# Patient Record
Sex: FEMALE | Race: WHITE | NOT HISPANIC OR LATINO | Employment: UNEMPLOYED | RURAL
[De-identification: names, ages, dates, MRNs, and addresses within clinical notes are randomized per-mention and may not be internally consistent; named-entity substitution may affect disease eponyms.]

---

## 2022-11-10 ENCOUNTER — OFFICE VISIT (OUTPATIENT)
Dept: FAMILY MEDICINE CLINIC | Facility: CLINIC | Age: 37
End: 2022-11-10

## 2022-11-10 VITALS
DIASTOLIC BLOOD PRESSURE: 78 MMHG | OXYGEN SATURATION: 99 % | WEIGHT: 192.44 LBS | SYSTOLIC BLOOD PRESSURE: 112 MMHG | BODY MASS INDEX: 36.33 KG/M2 | HEIGHT: 61 IN | HEART RATE: 86 BPM

## 2022-11-10 DIAGNOSIS — K62.5 BRBPR (BRIGHT RED BLOOD PER RECTUM): Primary | ICD-10-CM

## 2022-11-10 DIAGNOSIS — R19.09 ABDOMINAL MASS OF OTHER SITE: ICD-10-CM

## 2022-11-10 PROBLEM — R19.00 ABDOMINAL MASS: Status: ACTIVE | Noted: 2022-11-10

## 2022-11-10 PROCEDURE — 99214 OFFICE O/P EST MOD 30 MIN: CPT | Performed by: NURSE PRACTITIONER

## 2022-11-10 RX ORDER — HYDROCORTISONE ACETATE 25 MG/1
25 SUPPOSITORY RECTAL 2 TIMES DAILY
Qty: 28 SUPPOSITORY | Refills: 0 | Status: SHIPPED | OUTPATIENT
Start: 2022-11-10

## 2022-11-10 NOTE — ASSESSMENT & PLAN NOTE
Will refer to general surgery for evaluation.  States she will wait and see general surgery to see what they recommend before determining if imaging such as CT scan is needed.  Education provided and she knows to go to the hospital if she develops any pain or develops any new symptoms.  Informed possibly could be a hernia.  Go to ED if worsens.  Return to clinic or ED with any issues or concerns.

## 2022-11-10 NOTE — PROGRESS NOTES
"Chief Complaint  Rectal Bleeding    Subjective          Jules Lorena Cuevas presents to [facility]  History of Present Illness     Patient states about a week ago she noticed some blood on her toilet paper and in the toilet bowl after having a bowel movement.  States she has noticed it about 3 times total.  Only happens with bowel movement.  States stool looks fine with no blood in the stool no black stool.  No nausea no vomiting no abdominal pain.  States she is also randomly noticed a small mass present to her abdominal area near the bellybutton.  Unsure if it is related.  States at times it does get a little bit sore.  No fever no chills no dizziness no headache no chest pain no chest pressure no shortness of breath no trouble breathing.  States no family history of any GI issues.  She does states she usually is regular with her bowel movements having them 1-2 times a day but states over the past couple weeks she feels she has gotten a little bit backed up.  States she has been averaging a bowel movement over the past 1 to 2 weeks about every other day and her stool is hard.    Objective   Vital Signs:   /78   Pulse 86   Ht 154.9 cm (61\")   Wt 87.3 kg (192 lb 7 oz)   SpO2 99%   BMI 36.36 kg/m²     Body mass index is 36.36 kg/m².    Review of Systems   Constitutional: Negative for chills, fatigue, fever and unexpected weight loss.   Respiratory: Negative for cough, shortness of breath and wheezing.    Cardiovascular: Negative for chest pain.   Gastrointestinal: Positive for abdominal pain, anal bleeding and constipation. Negative for abdominal distention, diarrhea, nausea, rectal pain, vomiting, GERD and indigestion.        States the abdominal pain is relating to the mild occasional discomfort she has noticed to the small mass like area above belly button    Genitourinary: Negative for dysuria, frequency and hematuria.   Musculoskeletal: Negative for arthralgias and " myalgias.   Skin: Negative for rash and wound.   Neurological: Negative for dizziness, weakness, light-headedness and headache.   Psychiatric/Behavioral: Negative for suicidal ideas.       Past History:  Medical History: has a past medical history of Anxiety.   Surgical History: has no past surgical history on file.   Family History: family history includes Breast cancer in an other family member.   Social History: reports that she has never smoked. She has never used smokeless tobacco.    PHQ-2 Depression Screening  Little interest or pleasure in doing things? 0-->not at all   Feeling down, depressed, or hopeless? 0-->not at all   PHQ-2 Total Score 0        PHQ-9 Depression Screening  Little interest or pleasure in doing things? 0-->not at all   Feeling down, depressed, or hopeless? 0-->not at all   Trouble falling or staying asleep, or sleeping too much?     Feeling tired or having little energy?     Poor appetite or overeating?     Feeling bad about yourself - or that you are a failure or have let yourself or your family down?     Trouble concentrating on things, such as reading the newspaper or watching television?     Moving or speaking so slowly that other people could have noticed? Or the opposite - being so fidgety or restless that you have been moving around a lot more than usual?     Thoughts that you would be better off dead, or of hurting yourself in some way?     PHQ-9 Total Score 0   If you checked off any problems, how difficult have these problems made it for you to do your work, take care of things at home, or get along with other people?       PHQ-9 Total Score: 0      Patient screened positive for depression based on a PHQ-9 score of 0 on 11/10/2022. Follow-up recommendations include:       Current Outpatient Medications:   •  hydrocortisone (ANUSOL-HC) 25 MG suppository, Insert 1 suppository into the rectum 2 (Two) Times a Day. Up to 2 weeks, Disp: 28 suppository, Rfl: 0   (Not in a hospital  admission)     Allergies: Patient has no known allergies.    Physical Exam  Constitutional:       Appearance: Normal appearance.   Cardiovascular:      Rate and Rhythm: Normal rate and regular rhythm.      Heart sounds: Normal heart sounds.   Pulmonary:      Effort: Pulmonary effort is normal.      Breath sounds: Normal breath sounds.   Abdominal:      General: Abdomen is flat. Bowel sounds are normal. There is no distension.      Palpations: Abdomen is soft.      Tenderness: There is no abdominal tenderness. There is no guarding or rebound.   Genitourinary:     Comments: Pt. Denied examination and understands the risk.   Skin:     Findings: No erythema, lesion or rash.          Neurological:      General: No focal deficit present.      Mental Status: She is alert and oriented to person, place, and time. Mental status is at baseline.   Psychiatric:         Mood and Affect: Mood normal.         Behavior: Behavior normal.         Thought Content: Thought content normal.         Judgment: Judgment normal.          Result Review :                   Assessment and Plan    Diagnoses and all orders for this visit:    1. BRBPR (bright red blood per rectum) (Primary)  Assessment & Plan:  Patient denied physical examination.  Informed her this possibly could be due to hemorrhoids so I will give her a round of Anusol suppositories to use.  Informed her that she really needs to control her constipation so informed her to try adding on Metamucil daily, increasing her water intake, and using MiraLAX.  Education provided.  Risk of meds discussed and understood.  Will refer to general surgery for evaluation of this.  Go to ED if worsens.  Return to clinic or ED with any issues or concerns.    Orders:  -     hydrocortisone (ANUSOL-HC) 25 MG suppository; Insert 1 suppository into the rectum 2 (Two) Times a Day. Up to 2 weeks  Dispense: 28 suppository; Refill: 0  -     Ambulatory Referral to General Surgery  -     CBC & Differential;  Future    2. Abdominal mass of other site  Assessment & Plan:  Will refer to general surgery for evaluation.  States she will wait and see general surgery to see what they recommend before determining if imaging such as CT scan is needed.  Education provided and she knows to go to the hospital if she develops any pain or develops any new symptoms.  Informed possibly could be a hernia.  Go to ED if worsens.  Return to clinic or ED with any issues or concerns.    Orders:  -     Ambulatory Referral to General Surgery  -     Comprehensive Metabolic Panel; Future                 Follow Up   Return if symptoms worsen or fail to improve.  Patient was given instructions and counseling regarding her condition or for health maintenance advice. Please see specific information pulled into the AVS if appropriate.     JAREK Forrest

## 2022-11-10 NOTE — ASSESSMENT & PLAN NOTE
Patient denied physical examination.  Informed her this possibly could be due to hemorrhoids so I will give her a round of Anusol suppositories to use.  Informed her that she really needs to control her constipation so informed her to try adding on Metamucil daily, increasing her water intake, and using MiraLAX.  Education provided.  Risk of meds discussed and understood.  Will refer to general surgery for evaluation of this.  Go to ED if worsens.  Return to clinic or ED with any issues or concerns.

## 2022-11-11 LAB
ALBUMIN SERPL-MCNC: 4.3 G/DL (ref 3.8–4.8)
ALBUMIN/GLOB SERPL: 2 {RATIO} (ref 1.2–2.2)
ALP SERPL-CCNC: 59 IU/L (ref 44–121)
ALT SERPL-CCNC: 14 IU/L (ref 0–32)
AST SERPL-CCNC: 17 IU/L (ref 0–40)
BASOPHILS # BLD AUTO: 0.1 X10E3/UL (ref 0–0.2)
BASOPHILS NFR BLD AUTO: 1 %
BILIRUB SERPL-MCNC: <0.2 MG/DL (ref 0–1.2)
BUN SERPL-MCNC: 15 MG/DL (ref 6–20)
BUN/CREAT SERPL: 14 (ref 9–23)
CALCIUM SERPL-MCNC: 9 MG/DL (ref 8.7–10.2)
CHLORIDE SERPL-SCNC: 106 MMOL/L (ref 96–106)
CO2 SERPL-SCNC: 21 MMOL/L (ref 20–29)
CREAT SERPL-MCNC: 1.08 MG/DL (ref 0.57–1)
EGFRCR SERPLBLD CKD-EPI 2021: 69 ML/MIN/1.73
EOSINOPHIL # BLD AUTO: 0.1 X10E3/UL (ref 0–0.4)
EOSINOPHIL NFR BLD AUTO: 1 %
ERYTHROCYTE [DISTWIDTH] IN BLOOD BY AUTOMATED COUNT: 12.8 % (ref 11.7–15.4)
GLOBULIN SER CALC-MCNC: 2.2 G/DL (ref 1.5–4.5)
GLUCOSE SERPL-MCNC: 91 MG/DL (ref 70–99)
HCT VFR BLD AUTO: 35.5 % (ref 34–46.6)
HGB BLD-MCNC: 11.6 G/DL (ref 11.1–15.9)
IMM GRANULOCYTES # BLD AUTO: 0 X10E3/UL (ref 0–0.1)
IMM GRANULOCYTES NFR BLD AUTO: 0 %
LYMPHOCYTES # BLD AUTO: 2.3 X10E3/UL (ref 0.7–3.1)
LYMPHOCYTES NFR BLD AUTO: 23 %
MCH RBC QN AUTO: 29.8 PG (ref 26.6–33)
MCHC RBC AUTO-ENTMCNC: 32.7 G/DL (ref 31.5–35.7)
MCV RBC AUTO: 91 FL (ref 79–97)
MONOCYTES # BLD AUTO: 0.6 X10E3/UL (ref 0.1–0.9)
MONOCYTES NFR BLD AUTO: 6 %
NEUTROPHILS # BLD AUTO: 6.9 X10E3/UL (ref 1.4–7)
NEUTROPHILS NFR BLD AUTO: 69 %
PLATELET # BLD AUTO: 330 X10E3/UL (ref 150–450)
POTASSIUM SERPL-SCNC: 4.3 MMOL/L (ref 3.5–5.2)
PROT SERPL-MCNC: 6.5 G/DL (ref 6–8.5)
RBC # BLD AUTO: 3.89 X10E6/UL (ref 3.77–5.28)
SODIUM SERPL-SCNC: 140 MMOL/L (ref 134–144)
WBC # BLD AUTO: 10 X10E3/UL (ref 3.4–10.8)

## 2023-10-20 ENCOUNTER — OFFICE VISIT (OUTPATIENT)
Dept: FAMILY MEDICINE CLINIC | Facility: CLINIC | Age: 38
End: 2023-10-20
Payer: COMMERCIAL

## 2023-10-20 VITALS
DIASTOLIC BLOOD PRESSURE: 82 MMHG | WEIGHT: 209.8 LBS | SYSTOLIC BLOOD PRESSURE: 128 MMHG | HEIGHT: 61 IN | TEMPERATURE: 98.4 F | BODY MASS INDEX: 39.61 KG/M2 | HEART RATE: 78 BPM

## 2023-10-20 DIAGNOSIS — F41.9 ANXIETY: Primary | ICD-10-CM

## 2023-10-20 DIAGNOSIS — E66.9 CLASS 2 OBESITY WITHOUT SERIOUS COMORBIDITY WITH BODY MASS INDEX (BMI) OF 39.0 TO 39.9 IN ADULT, UNSPECIFIED OBESITY TYPE: ICD-10-CM

## 2023-10-20 DIAGNOSIS — M62.838 MUSCLE SPASM: ICD-10-CM

## 2023-10-20 DIAGNOSIS — F33.0 MAJOR DEPRESSIVE DISORDER, RECURRENT, MILD: ICD-10-CM

## 2023-10-20 PROBLEM — E66.812 CLASS 2 OBESITY WITHOUT SERIOUS COMORBIDITY WITH BODY MASS INDEX (BMI) OF 39.0 TO 39.9 IN ADULT: Status: ACTIVE | Noted: 2023-10-20

## 2023-10-20 RX ORDER — BUSPIRONE HYDROCHLORIDE 5 MG/1
5 TABLET ORAL 3 TIMES DAILY
Qty: 90 TABLET | Refills: 5 | Status: SHIPPED | OUTPATIENT
Start: 2023-10-20

## 2023-10-20 RX ORDER — KETOROLAC TROMETHAMINE 10 MG/1
10 TABLET, FILM COATED ORAL EVERY 6 HOURS PRN
Qty: 20 TABLET | Refills: 0 | Status: SHIPPED | OUTPATIENT
Start: 2023-10-20 | End: 2023-10-25

## 2023-10-20 RX ORDER — ESCITALOPRAM OXALATE 10 MG/1
10 TABLET ORAL DAILY
Qty: 90 TABLET | Refills: 1 | Status: SHIPPED | OUTPATIENT
Start: 2023-10-20

## 2023-10-20 RX ORDER — KETOROLAC TROMETHAMINE 30 MG/ML
30 INJECTION, SOLUTION INTRAMUSCULAR; INTRAVENOUS ONCE
Status: COMPLETED | OUTPATIENT
Start: 2023-10-20 | End: 2023-10-20

## 2023-10-20 RX ORDER — BACLOFEN 10 MG/1
10 TABLET ORAL NIGHTLY PRN
Qty: 30 TABLET | Refills: 1 | Status: SHIPPED | OUTPATIENT
Start: 2023-10-20

## 2023-10-20 RX ADMIN — KETOROLAC TROMETHAMINE 30 MG: 30 INJECTION, SOLUTION INTRAMUSCULAR; INTRAVENOUS at 13:55

## 2023-10-20 NOTE — PROGRESS NOTES
Subjective     Chief Complaint  Anxiety and Depression (Few wks)    Subjective          Jules Cuevas is a 38 y.o. female who presents today to [facility] for initial evaluation .    HPI:   History of Present Illness    Ms. Cuevas is a pleasant 38-year-old female who presents today to establish care with a primary care physician.  Her past medical history includes anxiety and depression, in the past she did have an episode of bright red blood per rectum was found to be hemorrhoids.    Over the last few weeks she has been experiencing increase in her anxiety and depression. In the past she took Lexapro for her anxiety and depression. She noted that it did help. She was able to taper off due to not being symptomatic. However, over the last few weeks symptoms have been a lot worse. She has been to counseling in the past, not currently. She reports that her insurance does not cover counseling. She is tearful in office today.     She states that she slept wrong last night and has significant muscle spasm in her neck.     The following portions of the patient's history were reviewed and updated as appropriate: allergies, current medications, past family history, past medical history, past social history, past surgical history and problem list.    Objective     Objective     Allergy:   No Known Allergies     Current Medications:   Current Outpatient Medications   Medication Sig Dispense Refill    baclofen (LIORESAL) 10 MG tablet Take 1 tablet by mouth At Night As Needed for Muscle Spasms. 30 tablet 1    busPIRone (BUSPAR) 5 MG tablet Take 1 tablet by mouth 3 (Three) Times a Day. 90 tablet 5    escitalopram (Lexapro) 10 MG tablet Take 1 tablet by mouth Daily. 90 tablet 1    hydrocortisone (ANUSOL-HC) 25 MG suppository Insert 1 suppository into the rectum 2 (Two) Times a Day. Up to 2 weeks 28 suppository 0    ketorolac (TORADOL) 10 MG tablet Take 1 tablet by mouth Every 6 (Six)  "Hours As Needed for Moderate Pain for up to 5 days. 20 tablet 0     Current Facility-Administered Medications   Medication Dose Route Frequency Provider Last Rate Last Admin    ketorolac (TORADOL) injection 30 mg  30 mg Intramuscular Once Urvashi, Lucero, DO        ketorolac (TORADOL) injection 30 mg  30 mg Intramuscular Once Urvashi, Lucero, DO           Past Medical History:  Past Medical History:   Diagnosis Date    Anxiety        Past Surgical History:  History reviewed. No pertinent surgical history.    Social History:  Social History     Socioeconomic History    Marital status: Single   Tobacco Use    Smoking status: Never     Passive exposure: Never    Smokeless tobacco: Never   Vaping Use    Vaping Use: Never used   Substance and Sexual Activity    Alcohol use: Never    Drug use: Never    Sexual activity: Defer       Family History:  Family History   Problem Relation Age of Onset    Breast cancer Other         Close relative - grandmother           Vital Signs:   /82   Pulse 78   Temp 98.4 °F (36.9 °C) (Infrared)   Ht 154.9 cm (60.98\")   Wt 95.2 kg (209 lb 12.8 oz)   BMI 39.66 kg/m²      Physical Exam:  Physical Exam  Constitutional:       Appearance: She is not ill-appearing.   Eyes:      Pupils: Pupils are equal, round, and reactive to light.   Cardiovascular:      Rate and Rhythm: Normal rate.      Pulses: Normal pulses.   Pulmonary:      Effort: Pulmonary effort is normal.      Breath sounds: Normal breath sounds.   Neurological:      General: No focal deficit present.      Mental Status: She is alert. Mental status is at baseline.   Psychiatric:         Mood and Affect: Mood is anxious. Affect is tearful.         Speech: Speech normal.         Behavior: Behavior normal.         Thought Content: Thought content does not include homicidal or suicidal ideation.         Cognition and Memory: Cognition normal.               PHQ-9 Score  PHQ-9 Total Score: 8     Lab Review  No visits with results " within 3 Month(s) from this visit.   Latest known visit with results is:   Office Visit on 11/10/2022   Component Date Value Ref Range Status    Glucose 11/10/2022 91  70 - 99 mg/dL Final    BUN 11/10/2022 15  6 - 20 mg/dL Final    Creatinine 11/10/2022 1.08 (H)  0.57 - 1.00 mg/dL Final    EGFR Result 11/10/2022 69  >59 mL/min/1.73 Final    BUN/Creatinine Ratio 11/10/2022 14  9 - 23 Final    Sodium 11/10/2022 140  134 - 144 mmol/L Final    Potassium 11/10/2022 4.3  3.5 - 5.2 mmol/L Final    Chloride 11/10/2022 106  96 - 106 mmol/L Final    Total CO2 11/10/2022 21  20 - 29 mmol/L Final    Calcium 11/10/2022 9.0  8.7 - 10.2 mg/dL Final    Total Protein 11/10/2022 6.5  6.0 - 8.5 g/dL Final    Albumin 11/10/2022 4.3  3.8 - 4.8 g/dL Final    Globulin 11/10/2022 2.2  1.5 - 4.5 g/dL Final    A/G Ratio 11/10/2022 2.0  1.2 - 2.2 Final    Total Bilirubin 11/10/2022 <0.2  0.0 - 1.2 mg/dL Final    Alkaline Phosphatase 11/10/2022 59  44 - 121 IU/L Final    AST (SGOT) 11/10/2022 17  0 - 40 IU/L Final    ALT (SGPT) 11/10/2022 14  0 - 32 IU/L Final    WBC 11/10/2022 10.0  3.4 - 10.8 x10E3/uL Final    RBC 11/10/2022 3.89  3.77 - 5.28 x10E6/uL Final    Hemoglobin 11/10/2022 11.6  11.1 - 15.9 g/dL Final    Hematocrit 11/10/2022 35.5  34.0 - 46.6 % Final    MCV 11/10/2022 91  79 - 97 fL Final    MCH 11/10/2022 29.8  26.6 - 33.0 pg Final    MCHC 11/10/2022 32.7  31.5 - 35.7 g/dL Final    RDW 11/10/2022 12.8  11.7 - 15.4 % Final    Platelets 11/10/2022 330  150 - 450 x10E3/uL Final    Neutrophil Rel % 11/10/2022 69  Not Estab. % Final    Lymphocyte Rel % 11/10/2022 23  Not Estab. % Final    Monocyte Rel % 11/10/2022 6  Not Estab. % Final    Eosinophil Rel % 11/10/2022 1  Not Estab. % Final    Basophil Rel % 11/10/2022 1  Not Estab. % Final    Neutrophils Absolute 11/10/2022 6.9  1.4 - 7.0 x10E3/uL Final    Lymphocytes Absolute 11/10/2022 2.3  0.7 - 3.1 x10E3/uL Final    Monocytes Absolute 11/10/2022 0.6  0.1 - 0.9 x10E3/uL Final     Eosinophils Absolute 11/10/2022 0.1  0.0 - 0.4 x10E3/uL Final    Basophils Absolute 11/10/2022 0.1  0.0 - 0.2 x10E3/uL Final    Immature Granulocyte Rel % 11/10/2022 0  Not Estab. % Final    Immature Grans Absolute 11/10/2022 0.0  0.0 - 0.1 x10E3/uL Final        Radiology Results        Assessment / Plan         Assessment and Plan   Diagnoses and all orders for this visit:    1. Anxiety (Primary)  Assessment & Plan:  Patient's depression and anxiety  is recurrent and is moderate without psychosis. Their depression is currently active and the condition is worsening. This will be reassessed at the next regular appointment. F/U as described:patient was prescribed an antidepressant medicine.    I had a discussion with this patient about medication options.   After, discussion will start: Lexapro and Buspar  Discussed with patient that it can take 4-6 weeks before they sees the best benefit in symptoms.   They deny SI/HI          Orders:  -     escitalopram (Lexapro) 10 MG tablet; Take 1 tablet by mouth Daily.  Dispense: 90 tablet; Refill: 1  -     busPIRone (BUSPAR) 5 MG tablet; Take 1 tablet by mouth 3 (Three) Times a Day.  Dispense: 90 tablet; Refill: 5    2. Major depressive disorder, recurrent, mild  -     escitalopram (Lexapro) 10 MG tablet; Take 1 tablet by mouth Daily.  Dispense: 90 tablet; Refill: 1  -     busPIRone (BUSPAR) 5 MG tablet; Take 1 tablet by mouth 3 (Three) Times a Day.  Dispense: 90 tablet; Refill: 5    3. Class 2 obesity without serious comorbidity with body mass index (BMI) of 39.0 to 39.9 in adult, unspecified obesity type  Assessment & Plan:  Patient's (Body mass index is 39.66 kg/m².) indicates that they are obese (BMI >30) with health conditions that include none . Weight is newly identified. BMI  is above average; BMI management plan is completed. We discussed low calorie, low carb based diet program, portion control, and increasing exercise. (Shared in AVS)       4. Muscle  spasm  Assessment & Plan:  Rx Baclofen for muscle spasm  Toradol in office today   Apply a compressive ACE bandage. Rest and elevate the affected painful area.  Apply cold compresses intermittently as needed.  As pain recedes, begin normal activities slowly as tolerated.  Call if symptoms persist.       Orders:  -     baclofen (LIORESAL) 10 MG tablet; Take 1 tablet by mouth At Night As Needed for Muscle Spasms.  Dispense: 30 tablet; Refill: 1  -     ketorolac (TORADOL) injection 30 mg  -     ketorolac (TORADOL) injection 30 mg  -     ketorolac (TORADOL) 10 MG tablet; Take 1 tablet by mouth Every 6 (Six) Hours As Needed for Moderate Pain for up to 5 days.  Dispense: 20 tablet; Refill: 0        Discussed possible differential diagnoses, testing, treatment, recommended non-pharmacological interventions, risks, warning signs to monitor for that would indicate need for follow-up in clinic or ER. If no improvement with these regimens or you have new or worsening symptoms follow-up. Patient verbalizes understanding and agreement with plan of care. Denies further needs or concerns.     Patient was given instructions and counseling regarding her condition and for health maintenance advised.      Health Maintenance  Health Maintenance:   Health Maintenance Due   Topic Date Due    HEPATITIS C SCREENING  Never done    ANNUAL PHYSICAL  Never done    PAP SMEAR  Never done        Meds ordered during this visit  New Medications Ordered This Visit   Medications    escitalopram (Lexapro) 10 MG tablet     Sig: Take 1 tablet by mouth Daily.     Dispense:  90 tablet     Refill:  1    busPIRone (BUSPAR) 5 MG tablet     Sig: Take 1 tablet by mouth 3 (Three) Times a Day.     Dispense:  90 tablet     Refill:  5    baclofen (LIORESAL) 10 MG tablet     Sig: Take 1 tablet by mouth At Night As Needed for Muscle Spasms.     Dispense:  30 tablet     Refill:  1    ketorolac (TORADOL) injection 30 mg    ketorolac (TORADOL) injection 30 mg     ketorolac (TORADOL) 10 MG tablet     Sig: Take 1 tablet by mouth Every 6 (Six) Hours As Needed for Moderate Pain for up to 5 days.     Dispense:  20 tablet     Refill:  0     Pt received injection in office today       Meds stopped during this visit:  There are no discontinued medications.     Visit Diagnoses    ICD-10-CM ICD-9-CM   1. Anxiety  F41.9 300.00   2. Major depressive disorder, recurrent, mild  F33.0 296.31   3. Class 2 obesity without serious comorbidity with body mass index (BMI) of 39.0 to 39.9 in adult, unspecified obesity type  E66.9 278.00    Z68.39 V85.39   4. Muscle spasm  M62.838 728.85       Patient was given instructions and counseling regarding her condition or for health maintenance advice. Please see specific information pulled into the AVS if appropriate.     Follow Up   Return in about 6 weeks (around 12/1/2023) for Recheck MYCHART .          This document has been electronically signed by Lucero Landin DO   October 20, 2023 13:54 EDT    Dictated Utilizing Dragon Dictation: Part of this note may be an electronic transcription/translation of spoken language to printed text using the Dragon Dictation System.    Lucero Landin D.O.  [facility] [place]

## 2023-10-20 NOTE — ASSESSMENT & PLAN NOTE
Patient's (Body mass index is 39.66 kg/m².) indicates that they are obese (BMI >30) with health conditions that include none . Weight is newly identified. BMI  is above average; BMI management plan is completed. We discussed low calorie, low carb based diet program, portion control, and increasing exercise. (Shared in AVS)

## 2023-10-20 NOTE — ASSESSMENT & PLAN NOTE
Rx Baclofen for muscle spasm  Toradol in office today   Apply a compressive ACE bandage. Rest and elevate the affected painful area.  Apply cold compresses intermittently as needed.  As pain recedes, begin normal activities slowly as tolerated.  Call if symptoms persist.

## 2023-10-20 NOTE — ASSESSMENT & PLAN NOTE
Patient's depression and anxiety  is recurrent and is moderate without psychosis. Their depression is currently active and the condition is worsening. This will be reassessed at the next regular appointment. F/U as described:patient was prescribed an antidepressant medicine.    I had a discussion with this patient about medication options.   After, discussion will start: Lexapro and Buspar  Discussed with patient that it can take 4-6 weeks before they sees the best benefit in symptoms.   They deny SI/HI

## 2024-04-12 DIAGNOSIS — F41.9 ANXIETY: ICD-10-CM

## 2024-04-12 DIAGNOSIS — F33.0 MAJOR DEPRESSIVE DISORDER, RECURRENT, MILD: ICD-10-CM

## 2024-04-12 RX ORDER — ESCITALOPRAM OXALATE 10 MG/1
10 TABLET ORAL DAILY
Qty: 90 TABLET | Refills: 0 | Status: SHIPPED | OUTPATIENT
Start: 2024-04-12

## 2024-07-13 DIAGNOSIS — F41.9 ANXIETY: ICD-10-CM

## 2024-07-13 DIAGNOSIS — F33.0 MAJOR DEPRESSIVE DISORDER, RECURRENT, MILD: ICD-10-CM

## 2024-07-13 RX ORDER — ESCITALOPRAM OXALATE 10 MG/1
10 TABLET ORAL DAILY
Qty: 90 TABLET | Refills: 0 | Status: SHIPPED | OUTPATIENT
Start: 2024-07-13

## 2024-09-12 DIAGNOSIS — F33.0 MAJOR DEPRESSIVE DISORDER, RECURRENT, MILD: ICD-10-CM

## 2024-09-12 DIAGNOSIS — F41.9 ANXIETY: ICD-10-CM

## 2024-09-12 RX ORDER — BUSPIRONE HYDROCHLORIDE 5 MG/1
5 TABLET ORAL 3 TIMES DAILY
Qty: 90 TABLET | Refills: 0 | OUTPATIENT
Start: 2024-09-12

## 2024-10-10 DIAGNOSIS — F41.9 ANXIETY: ICD-10-CM

## 2024-10-10 DIAGNOSIS — F33.0 MAJOR DEPRESSIVE DISORDER, RECURRENT, MILD: ICD-10-CM

## 2024-10-10 RX ORDER — ESCITALOPRAM OXALATE 10 MG/1
10 TABLET ORAL DAILY
Qty: 90 TABLET | Refills: 0 | Status: SHIPPED | OUTPATIENT
Start: 2024-10-10